# Patient Record
Sex: FEMALE | URBAN - METROPOLITAN AREA
[De-identification: names, ages, dates, MRNs, and addresses within clinical notes are randomized per-mention and may not be internally consistent; named-entity substitution may affect disease eponyms.]

---

## 2020-09-22 ENCOUNTER — LAB REQUISITION (OUTPATIENT)
Dept: LAB | Facility: HOSPITAL | Age: 40
End: 2020-09-22
Payer: MEDICARE

## 2020-09-22 DIAGNOSIS — Z11.59 ENCOUNTER FOR SCREENING FOR OTHER VIRAL DISEASES: ICD-10-CM

## 2020-09-22 PROCEDURE — U0003 INFECTIOUS AGENT DETECTION BY NUCLEIC ACID (DNA OR RNA); SEVERE ACUTE RESPIRATORY SYNDROME CORONAVIRUS 2 (SARS-COV-2) (CORONAVIRUS DISEASE [COVID-19]), AMPLIFIED PROBE TECHNIQUE, MAKING USE OF HIGH THROUGHPUT TECHNOLOGIES AS DESCRIBED BY CMS-2020-01-R: HCPCS | Performed by: INTERNAL MEDICINE

## 2020-09-23 LAB — SARS-COV-2 N GENE RESP QL NAA+PROBE: NEGATIVE

## 2020-09-29 ENCOUNTER — LAB REQUISITION (OUTPATIENT)
Dept: LAB | Facility: HOSPITAL | Age: 40
End: 2020-09-29
Payer: MEDICARE

## 2020-09-29 DIAGNOSIS — Z11.59 ENCOUNTER FOR SCREENING FOR OTHER VIRAL DISEASES: ICD-10-CM

## 2020-09-29 PROCEDURE — U0003 INFECTIOUS AGENT DETECTION BY NUCLEIC ACID (DNA OR RNA); SEVERE ACUTE RESPIRATORY SYNDROME CORONAVIRUS 2 (SARS-COV-2) (CORONAVIRUS DISEASE [COVID-19]), AMPLIFIED PROBE TECHNIQUE, MAKING USE OF HIGH THROUGHPUT TECHNOLOGIES AS DESCRIBED BY CMS-2020-01-R: HCPCS | Performed by: INTERNAL MEDICINE

## 2020-10-01 LAB — SARS-COV-2 N GENE RESP QL NAA+PROBE: NEGATIVE

## 2020-10-06 ENCOUNTER — LAB REQUISITION (OUTPATIENT)
Dept: LAB | Facility: HOSPITAL | Age: 40
End: 2020-10-06
Payer: MEDICARE

## 2020-10-06 DIAGNOSIS — U07.1 COVID-19: ICD-10-CM

## 2020-10-06 PROCEDURE — U0003 INFECTIOUS AGENT DETECTION BY NUCLEIC ACID (DNA OR RNA); SEVERE ACUTE RESPIRATORY SYNDROME CORONAVIRUS 2 (SARS-COV-2) (CORONAVIRUS DISEASE [COVID-19]), AMPLIFIED PROBE TECHNIQUE, MAKING USE OF HIGH THROUGHPUT TECHNOLOGIES AS DESCRIBED BY CMS-2020-01-R: HCPCS | Performed by: INTERNAL MEDICINE

## 2020-10-07 LAB — SARS-COV-2 RNA RESP QL NAA+PROBE: NEGATIVE

## 2020-10-13 PROCEDURE — U0003 INFECTIOUS AGENT DETECTION BY NUCLEIC ACID (DNA OR RNA); SEVERE ACUTE RESPIRATORY SYNDROME CORONAVIRUS 2 (SARS-COV-2) (CORONAVIRUS DISEASE [COVID-19]), AMPLIFIED PROBE TECHNIQUE, MAKING USE OF HIGH THROUGHPUT TECHNOLOGIES AS DESCRIBED BY CMS-2020-01-R: HCPCS | Performed by: INTERNAL MEDICINE

## 2020-10-14 ENCOUNTER — LAB REQUISITION (OUTPATIENT)
Dept: LAB | Facility: HOSPITAL | Age: 40
End: 2020-10-14
Payer: MEDICARE

## 2020-10-14 DIAGNOSIS — Z11.59 ENCOUNTER FOR SCREENING FOR OTHER VIRAL DISEASES: ICD-10-CM

## 2020-10-15 LAB — SARS-COV-2 RNA SPEC QL NAA+PROBE: NOT DETECTED

## 2024-09-16 ENCOUNTER — APPOINTMENT (OUTPATIENT)
Age: 44
End: 2024-09-16
Payer: COMMERCIAL

## 2024-09-16 ENCOUNTER — OFFICE VISIT (OUTPATIENT)
Age: 44
End: 2024-09-16

## 2024-09-16 VITALS — DIASTOLIC BLOOD PRESSURE: 65 MMHG | WEIGHT: 128 LBS | SYSTOLIC BLOOD PRESSURE: 102 MMHG

## 2024-09-16 DIAGNOSIS — G89.29 CHRONIC NECK PAIN: ICD-10-CM

## 2024-09-16 DIAGNOSIS — M25.561 CHRONIC PAIN OF RIGHT KNEE: ICD-10-CM

## 2024-09-16 DIAGNOSIS — M54.2 CHRONIC NECK PAIN: ICD-10-CM

## 2024-09-16 DIAGNOSIS — G89.29 CHRONIC PAIN OF RIGHT KNEE: ICD-10-CM

## 2024-09-16 DIAGNOSIS — G89.29 CHRONIC RIGHT-SIDED LOW BACK PAIN WITHOUT SCIATICA: Primary | ICD-10-CM

## 2024-09-16 DIAGNOSIS — M54.50 LOW BACK PAIN, UNSPECIFIED BACK PAIN LATERALITY, UNSPECIFIED CHRONICITY, UNSPECIFIED WHETHER SCIATICA PRESENT: ICD-10-CM

## 2024-09-16 DIAGNOSIS — M54.50 CHRONIC RIGHT-SIDED LOW BACK PAIN WITHOUT SCIATICA: Primary | ICD-10-CM

## 2024-09-16 PROCEDURE — 99204 OFFICE O/P NEW MOD 45 MIN: CPT | Performed by: ORTHOPAEDIC SURGERY

## 2024-09-16 PROCEDURE — 72110 X-RAY EXAM L-2 SPINE 4/>VWS: CPT

## 2024-09-16 RX ORDER — DARIDOREXANT 25 MG/1
TABLET, FILM COATED ORAL
COMMUNITY
Start: 2024-08-26

## 2024-09-16 RX ORDER — LANSOPRAZOLE 30 MG/1
CAPSULE, DELAYED RELEASE ORAL
COMMUNITY

## 2024-09-16 RX ORDER — DESLORATADINE 5 MG/1
TABLET ORAL
COMMUNITY

## 2024-09-16 RX ORDER — MAGNESIUM OXIDE 400 MG/1
400 TABLET ORAL
COMMUNITY

## 2024-09-16 RX ORDER — PHENOL 1.4 %
AEROSOL, SPRAY (ML) MUCOUS MEMBRANE
COMMUNITY

## 2024-09-16 RX ORDER — CYCLOBENZAPRINE HCL 10 MG
10 TABLET ORAL 3 TIMES DAILY PRN
COMMUNITY
Start: 2024-08-23

## 2024-09-16 RX ORDER — ALBUTEROL SULFATE 90 UG/1
1 INHALANT RESPIRATORY (INHALATION)
COMMUNITY

## 2024-09-16 RX ORDER — BUDESONIDE AND FORMOTEROL FUMARATE DIHYDRATE 160; 4.5 UG/1; UG/1
2 AEROSOL RESPIRATORY (INHALATION) 2 TIMES DAILY
COMMUNITY

## 2024-09-16 RX ORDER — MONTELUKAST SODIUM 10 MG/1
10 TABLET ORAL
COMMUNITY

## 2024-09-16 NOTE — PROGRESS NOTES
[facility] ORTHOPEDIC SPINE SURGERY  DR.AMIR RAINE MD  [address] [facility] [address] [phone]    HISTORY OF PRESENT ILLNESS:    Stephanie Lucas is a 44 y.o. female who presents for initial evaluation of lumbar spine and cervical spine. Patient reports she has pain in the right side of her neck and going into all of the joints on the right side of her body. Patient states the worst pain is along the right side of her back and right side of her low back pain. Patient states pain is constant in nature. Patient reports she has shooting pain that goes down the right leg ending at the foot. Patient states she has pain her tailbone when she sits. Patient also mentions she has numbness in the right big toe and her fingertips since receiving chemotherapy for breast cancer. Patient reports she sees a chiropractor once a month. Patient last did physical therapy for her back in October 2023. Patient mentions she had a prior neck and SI joint injection last year at [facility]. Patient denies any prior surgeries for her back. 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.       ALLERGIES:   Allergies   Allergen Reactions    Bee Pollen Angioedema and Itching    Ciprofloxacin Shortness Of Breath     SOB    Pollen Extract Angioedema, Itching and Shortness Of Breath       MEDICATIONS:    Current Outpatient Medications:     albuterol (PROVENTIL HFA,VENTOLIN HFA) 90 mcg/act inhaler, Inhale 1 puff, Disp: , Rfl:     budesonide-formoterol (SYMBICORT) 160-4.5 mcg/act inhaler, Inhale 2 puffs 2 (two) times a day, Disp: , Rfl:     cyclobenzaprine (FLEXERIL) 10 mg tablet, Take 10 mg by mouth Three times daily as needed, Disp: , Rfl:     desloratadine (CLARINEX) 5 MG tablet, Take by mouth, Disp: , Rfl:     magnesium oxide (MAG-OX) 400 mg tablet, Take 400 mg by mouth, Disp: , Rfl:     Quviviq 25 MG TABS, Take 25 mg by mouth nightly as needed (insomnia)., Disp: , Rfl:     lansoprazole (PREVACID) 30 mg capsule, , Disp: , Rfl:     Melatonin 10  MG TABS, Take by mouth (Patient not taking: Reported on 9/16/2024), Disp: , Rfl:     montelukast (SINGULAIR) 10 mg tablet, Take 10 mg by mouth (Patient not taking: Reported on 9/16/2024), Disp: , Rfl:      PAST MEDICAL HISTORY:   No past medical history on file.    PAST SURGICAL HISTORY:  No past surgical history on file.    SOCIAL HISTORY:  Social History     Tobacco Use   Smoking Status Not on file   Smokeless Tobacco Not on file          PHYSICAL EXAM:  44 y.o. female sitting comfortably on exam chair in no apparent distress.   Ambulates with normal gait  Able to go up on toes and heels  Able to balance on one leg  No TTP over midline cervical, thoracic, or lumbar spine.   TTP over right lumbar paraspinal muscles.   5/5 motor strength with normal sensation in bilateral lower extremities  Absent deep tendon reflexes bilateral lower extremities  Right knee swelling appreciated.   Mild effusion right knee palpable.       RADIOGRAPHIC STUDIES:  MRI, cervical spine, 2/08/2023, REPORT ONLY: Mild spondylosis at C2-C3 and C3-C4. Small disc protrusion at T2-T3. No spinal canal or foraminal stenosis. No spinal cord signal abnormality or pathologic enhancement in the spinal canal. Signal abnormality and enhancement at the right lung apex is indeterminate and incompletely characterized on this nondedicated study. If there is prior history of radiation therapy, this finding could reflect fibrosis. CT of the chest is suggested for further evaluation as an underlying malignant/infiltrative process cannot be excluded. Straightening of the cervical lordosis which could be related to patient positioning or muscle spasm.   MRI, lumbar spine, 4/18/2023, REPORT ONLY: L1-L2: There is no spinal stenosis or foraminal narrowing.   L2-L3 : There is no spinal stenosis or foraminal narrowing. There is mild bilateral facet arthrosis. L3-L4: There is no spinal stenosis or foraminal narrowing. There is mild bilateral facet arthrosis. L4-L5:  There is a disc bulge with a broad-based small central disc herniation, facet arthrosis and ligamentum flavum hypertrophy causing mild spinal stenosis and moderate bilateral foraminal narrowing. L5-S1: There is a disc bulge with a central disc herniation, facet arthrosis resulting in mild-to-moderate bilateral foraminal narrowing and mild spinal stenosis.   Xrays, lumbar spine, 9/16/2024: Normal study. No significant degenerative changes. Normal alignment. No evidence of instability.     ASSESSMENT:  1. Chronic right-sided low back pain without sciatica  -     XR spine lumbar minimum 4 views non injury; Future; Expected date: 09/16/2024  2. Chronic neck pain  3. Chronic pain of right knee  -     MRI knee right  wo contrast; Future; Expected date: 09/16/2024      PLAN:  44 y.o. female with chronic right sided low back pain, chronic neck pain, and right knee effusion.     Xrays of lumbar spine were obtained and reviewed in the office today showing no significant findings. Unfortunately, MRI of cervical and lumbar spine imaging is not available for review today. However, the reports were reviewed. Patient was advised to have MRI imaging forwarded to our system, and we can review it. We will order an MRI of the right knee for further evaluation given persistent knee pain and swelling with right knee effusion on exam today. Patient was advised she should follow-up with a knee specialist at [facility] once the MRI of right knee results.     I did speak to the patient and inform her that without reviewing the films I cannot fully clarify the source of her pain.  The MRI send cervical spine is quite benign and is unlikely to be causing the right upper extremity symptoms.  MRI of the lumbar spine did note evidence of neuroforaminal stenosis which could explain the right lower extremity symptoms.  She has had 1 injection but that was an SI joint injections.  I cannot discuss role of epidural injections without reviewing the  films.    Patient may follow-up as needed.        Scribe Attestation      I,:  Beronica Garcia PA-C am acting as a scribe while in the presence of the attending physician.:       I,:  Pasquale Contreras MD personally performed the services described in this documentation    as scribed in my presence.:

## 2024-11-05 ENCOUNTER — TELEPHONE (OUTPATIENT)
Age: 44
End: 2024-11-05

## 2024-11-05 NOTE — TELEPHONE ENCOUNTER
Caller: Agus from [facility]    Doctor: Dr. Contreras    Reason for call: Agus asking if the order for the MRI for right knee can be faxed to the number below.  NPI: [id number]     Attn: Agus  Fax: [phone]    Call back#: [phone]